# Patient Record
Sex: FEMALE | Race: WHITE | Employment: FULL TIME | URBAN - METROPOLITAN AREA
[De-identification: names, ages, dates, MRNs, and addresses within clinical notes are randomized per-mention and may not be internally consistent; named-entity substitution may affect disease eponyms.]

---

## 2021-09-14 ENCOUNTER — TRANSFERRED RECORDS (OUTPATIENT)
Dept: HEALTH INFORMATION MANAGEMENT | Facility: CLINIC | Age: 41
End: 2021-09-14

## 2021-09-15 ENCOUNTER — TRANSCRIBE ORDERS (OUTPATIENT)
Dept: OTHER | Age: 41
End: 2021-09-15

## 2021-09-15 DIAGNOSIS — C53.9 ADENOCARCINOMA OF CERVIX (H): Primary | ICD-10-CM

## 2021-09-20 ENCOUNTER — PRE VISIT (OUTPATIENT)
Dept: OTHER | Age: 41
End: 2021-09-20

## 2021-09-29 NOTE — TELEPHONE ENCOUNTER
Action 9.29.21 8:10 AM THONY   Action Taken Cone cervical biopsies x2 in maybe 2005, at [facility] in [place], [place]. [phone] in [place], [place].    Faxed request to [facility] for pathology reports [phone]. Faxed request for records to [facility] [phone].

## 2021-10-13 NOTE — PROGRESS NOTES
RECORDS STATUS - ALL OTHER DIAGNOSIS      RECORDS RECEIVED FROM: [facility]/ [facility]   DATE RECEIVED: 10/18/2021    NOTES STATUS DETAILS   OFFICE NOTE from referring provider Maury Lopez is referring to GYN ONC    OFFICE NOTE from medical oncologist Complete  [facility]   DISCHARGE SUMMARY from hospital N/A    DISCHARGE REPORT from the ER     OPERATIVE REPORT Benign Biopsies were done at [facility]  See Endocervix biopsy in CE 9/14/2021    Pap Test 9/14/2021    MEDICATION LIST N/A    CLINICAL TRIAL TREATMENTS TO DATE     LABS     PATHOLOGY REPORTS Benign biopsies were done at [facility]  9/14/2021   Case: [id number]                                   Endocervix, curettage-Wispy fragments of ectocervix with no dysplasia or malignancy.    1/15/2020   Endocervix, curettage-Benign squamous epithelium and rare endocervical glands, negative for dysplasia   ANYTHING RELATED TO DIAGNOSIS Complete Labs last updated on 9/14/2021    GENONOMIC TESTING     TYPE:     IMAGING (NEED IMAGES & REPORT)     CT SCANS     MRI     MAMMO     ULTRASOUND     PET       Action    Action Taken 10/13/2021 8:48AM JONA     I called pt Kanwal - her phone went to .     I called [facility] in [place], [place] Phone: [phone]- they don't have any imagine on the pt. We have all relevant records in EP[facility]

## 2021-10-18 ENCOUNTER — PRE VISIT (OUTPATIENT)
Dept: ONCOLOGY | Facility: CLINIC | Age: 41
End: 2021-10-18

## 2024-01-02 ENCOUNTER — LAB REQUISITION (OUTPATIENT)
Dept: LAB | Facility: CLINIC | Age: 44
End: 2024-01-02

## 2024-01-02 DIAGNOSIS — Z12.4 ENCOUNTER FOR SCREENING FOR MALIGNANT NEOPLASM OF CERVIX: ICD-10-CM

## 2024-01-02 PROCEDURE — 88305 TISSUE EXAM BY PATHOLOGIST: CPT | Performed by: PATHOLOGY

## 2024-01-04 LAB
PATH REPORT.COMMENTS IMP SPEC: NORMAL
PATH REPORT.COMMENTS IMP SPEC: NORMAL
PATH REPORT.FINAL DX SPEC: NORMAL
PATH REPORT.GROSS SPEC: NORMAL
PATH REPORT.MICROSCOPIC SPEC OTHER STN: NORMAL
PATH REPORT.RELEVANT HX SPEC: NORMAL
PHOTO IMAGE: NORMAL

## 2025-04-05 ENCOUNTER — TRANSFERRED RECORDS (OUTPATIENT)
Dept: HEALTH INFORMATION MANAGEMENT | Facility: CLINIC | Age: 45
End: 2025-04-05

## 2025-04-14 ENCOUNTER — LAB REQUISITION (OUTPATIENT)
Dept: LAB | Facility: CLINIC | Age: 45
End: 2025-04-14

## 2025-04-14 ENCOUNTER — TRANSFERRED RECORDS (OUTPATIENT)
Dept: HEALTH INFORMATION MANAGEMENT | Facility: CLINIC | Age: 45
End: 2025-04-14
Payer: COMMERCIAL

## 2025-04-14 ENCOUNTER — MEDICAL CORRESPONDENCE (OUTPATIENT)
Dept: HEALTH INFORMATION MANAGEMENT | Facility: CLINIC | Age: 45
End: 2025-04-14
Payer: COMMERCIAL

## 2025-04-14 ENCOUNTER — PATIENT OUTREACH (OUTPATIENT)
Dept: ONCOLOGY | Facility: CLINIC | Age: 45
End: 2025-04-14
Payer: COMMERCIAL

## 2025-04-14 ENCOUNTER — TRANSCRIBE ORDERS (OUTPATIENT)
Dept: OTHER | Age: 45
End: 2025-04-14

## 2025-04-14 DIAGNOSIS — C53.9 MALIGNANT NEOPLASM OF CERVIX, UNSPECIFIED SITE (H): Primary | ICD-10-CM

## 2025-04-14 DIAGNOSIS — Z13.220 ENCOUNTER FOR SCREENING FOR LIPOID DISORDERS: ICD-10-CM

## 2025-04-14 DIAGNOSIS — Z13.1 ENCOUNTER FOR SCREENING FOR DIABETES MELLITUS: ICD-10-CM

## 2025-04-14 DIAGNOSIS — R61 GENERALIZED HYPERHIDROSIS: ICD-10-CM

## 2025-04-14 DIAGNOSIS — Z13.6 ENCOUNTER FOR SCREENING FOR CARDIOVASCULAR DISORDERS: ICD-10-CM

## 2025-04-14 LAB
BASOPHILS # BLD AUTO: 0.1 10E3/UL (ref 0–0.2)
BASOPHILS NFR BLD AUTO: 1 %
CHOLEST SERPL-MCNC: 198 MG/DL
EOSINOPHIL # BLD AUTO: 0.2 10E3/UL (ref 0–0.7)
EOSINOPHIL NFR BLD AUTO: 3 %
ERYTHROCYTE [DISTWIDTH] IN BLOOD BY AUTOMATED COUNT: 13.2 % (ref 10–15)
EST. AVERAGE GLUCOSE BLD GHB EST-MCNC: 114 MG/DL
FASTING STATUS PATIENT QL REPORTED: NO
HBA1C MFR BLD: 5.6 %
HCT VFR BLD AUTO: 41.3 % (ref 35–47)
HDLC SERPL-MCNC: 68 MG/DL
HGB BLD-MCNC: 13.2 G/DL (ref 11.7–15.7)
IMM GRANULOCYTES # BLD: 0 10E3/UL
IMM GRANULOCYTES NFR BLD: 0 %
LDLC SERPL CALC-MCNC: 106 MG/DL
LYMPHOCYTES # BLD AUTO: 2.3 10E3/UL (ref 0.8–5.3)
LYMPHOCYTES NFR BLD AUTO: 30 %
MCH RBC QN AUTO: 28.6 PG (ref 26.5–33)
MCHC RBC AUTO-ENTMCNC: 32 G/DL (ref 31.5–36.5)
MCV RBC AUTO: 89 FL (ref 78–100)
MONOCYTES # BLD AUTO: 0.6 10E3/UL (ref 0–1.3)
MONOCYTES NFR BLD AUTO: 8 %
NEUTROPHILS # BLD AUTO: 4.5 10E3/UL (ref 1.6–8.3)
NEUTROPHILS NFR BLD AUTO: 58 %
NONHDLC SERPL-MCNC: 130 MG/DL
NRBC # BLD AUTO: 0 10E3/UL
NRBC BLD AUTO-RTO: 0 /100
PLATELET # BLD AUTO: 303 10E3/UL (ref 150–450)
RBC # BLD AUTO: 4.62 10E6/UL (ref 3.8–5.2)
TRIGL SERPL-MCNC: 121 MG/DL
WBC # BLD AUTO: 7.6 10E3/UL (ref 4–11)

## 2025-04-14 PROCEDURE — 82465 ASSAY BLD/SERUM CHOLESTEROL: CPT | Performed by: OBSTETRICS & GYNECOLOGY

## 2025-04-14 PROCEDURE — 83718 ASSAY OF LIPOPROTEIN: CPT | Performed by: OBSTETRICS & GYNECOLOGY

## 2025-04-14 PROCEDURE — 85048 AUTOMATED LEUKOCYTE COUNT: CPT | Performed by: OBSTETRICS & GYNECOLOGY

## 2025-04-14 PROCEDURE — 85004 AUTOMATED DIFF WBC COUNT: CPT | Performed by: OBSTETRICS & GYNECOLOGY

## 2025-04-14 PROCEDURE — 83036 HEMOGLOBIN GLYCOSYLATED A1C: CPT | Performed by: OBSTETRICS & GYNECOLOGY

## 2025-04-14 NOTE — PROGRESS NOTES
New Patient Hematology / Oncology Nurse Navigator Note     Referral Date: 4/14/25    Referring provider:       Referring Clinic/Organization: Other - [facility]     Referred to: GynOnc    Requested provider (if applicable): First available - did not specify     Evaluation for : Per notes, history of adenocarcinoma in situ (vs adenocarcinoma as listed on referral)       Clinical History (per Nurse review of records provided):    Note from OBGYN      -- BOOKMARKED  1/2/24 PAP/HPV/Path:  Final Diagnosis   A. Endocervix, curettage:  -No endocervical epithelium identified.  -Superficial strips of benign squamous epithelium.    -- BOOKMARKED      Clinical Assessment / Barriers to Care (Per Nurse):  Pt lives in [place]     Records Location: [facility] Everywhere   Faxed - Media tab/Scanned     Records Needed:   Need Op note/path reports from pt's cone procedures (2 per note from referring clinic) unsure when these were completed. Please check with referring clinic first to see if they have her [facility] records/path reports    I located note from [facility] in 2010 regarding h/o cone procedures so may be at [facility] prior to 2010?    Need Path showing Adenocarcinoma of cervix or Adenocarcinoma In-Situ       Additional testing needed prior to consult:   Pending review of complete records    Referral updates and Plan:   Referral received, chart reviewed by nursing, flagged for records intake as I am unable to location any path reports showing Adenocarcinoma of cervix or AIS. Will follow-up once complete records are obtained.     OUTGOING CALL TO PT:   Introduced my role as nurse navigator with [facility] and that we have recd the referral to GynOnc, but are still working to obtain complete records (want to confirm whether she has h/o Cervical cancer vs AIS) prior to consult with GynOnc.     Explained to pt that she will receive a call from our scheduling team once we have complete  records.     Provided my direct call back number if questions in the meantime.     Jacki Butts, BSN, RN, PHN, OCN  Hematology/Oncology Nurse Navigator  [facility]  [phone]

## 2025-04-15 ENCOUNTER — PRE VISIT (OUTPATIENT)
Dept: ONCOLOGY | Facility: CLINIC | Age: 45
End: 2025-04-15
Payer: COMMERCIAL

## 2025-04-15 NOTE — TELEPHONE ENCOUNTER
RECORDS STATUS - ALL OTHER DIAGNOSIS      RECORDS RECEIVED FROM:    DIAGNOSIS: Malignant neoplasm of cervix, unspecified site (H) [C53.9]   NOTES STATUS DETAILS   OFFICE NOTE from referring provider Ext: [facility] 04/14/25: Dr. Emelyn Lopez   OFFICE NOTE from medical oncologist     OFFICE NOTE from other specialist     DISCHARGE SUMMARY from hospital     DISCHARGE REPORT from the ER     OPERATIVE REPORT Req 4/15 [facility] BX (2002?)    NM:  02/05/10: Attempted hysterosalpingogram   MEDICATION LIST [facility]    LABS     PATHOLOGY REPORTS  04/02/18:[id number]   ANYTHING RELATED TO DIAGNOSIS     PATHOLOGY FEDEX TRACKING   Tracking #:   GENONOMIC TESTING     TYPE:     IMAGING (NEED IMAGES & REPORT)     CT SCANS     MRI     XRAYS     ULTRASOUND     PET     IMAGE DISC FEDEX TRACKING   Tracking #:

## 2025-05-08 ENCOUNTER — PATIENT OUTREACH (OUTPATIENT)
Dept: ONCOLOGY | Facility: CLINIC | Age: 45
End: 2025-05-08

## 2025-05-08 ENCOUNTER — ONCOLOGY VISIT (OUTPATIENT)
Dept: ONCOLOGY | Facility: CLINIC | Age: 45
End: 2025-05-08
Attending: OBSTETRICS & GYNECOLOGY
Payer: COMMERCIAL

## 2025-05-08 VITALS
SYSTOLIC BLOOD PRESSURE: 128 MMHG | OXYGEN SATURATION: 100 % | HEART RATE: 68 BPM | BODY MASS INDEX: 25.49 KG/M2 | WEIGHT: 162.4 LBS | DIASTOLIC BLOOD PRESSURE: 85 MMHG | HEIGHT: 67 IN

## 2025-05-08 DIAGNOSIS — D06.9 ADENOCARCINOMA IN SITU (AIS) OF UTERINE CERVIX: Primary | ICD-10-CM

## 2025-05-08 PROCEDURE — 99214 OFFICE O/P EST MOD 30 MIN: CPT | Performed by: OBSTETRICS & GYNECOLOGY

## 2025-05-08 ASSESSMENT — PAIN SCALES - GENERAL: PAINLEVEL_OUTOF10: NO PAIN (0)

## 2025-05-08 NOTE — LETTER
2025      Kanwal Mcfadden  [address] [facility] [address]      Dear Colleague,    Thank you for referring your patient, Kanwal Mcfadden, to the [facility]. Please see a copy of my visit note below.                            Consult Notes on Referred Patient      Dr. Emelyn Lopez MD  [address] [place] [address]       RE: Kanwal Mcfadden  : 1980  NATHAN: 2025    Dear Dr. Emelyn Lopez:    I had the pleasure of seeing your patient Kanwal Mcfadden here at the Gynecologic Cancer Clinic at the [facility] on 2025.  As you know she is a very pleasant 44 year old woman with a remote diagnosis of ACIS/CIN3.  Given these findings she was subsequently sent to the Gynecologic Cancer Clinic for new patient consultation.     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, margins neg; no invasive disease   repeat cone - negative     PAp/ECC: negative   Pap/ECC negative   PAp/ECC neg   Pap/HPV negative    PAP/HPV negative - exams complicated by scarring of the cervix    Review of Systems:    Systemic           no weight changes; no fever; no chills; no night sweats; no appetite changes  Skin           no rashes, or lesions  Eye           no irritation; no changes in vision  Renato-Laryngeal           no dysphagia; no hoarseness   Pulmonary    no cough; no shortness of breath  Cardiovascular    no chest pain; no palpitations  Gastrointestinal    no diarrhea; no constipation; no abdominal pain; no changes in bowel  habits; no blood in stool  Genitourinary   no urinary frequency; no urinary urgency; no dysuria; no pain; no abnormal vaginal discharge; no abnormal vaginal bleeding  Breast   no breast discharge; no breast changes; no breast pain  Musculoskeletal    no myalgias; no arthralgias; no back pain  Psychiatric           no depressed mood; no  "anxiety    Hematologic           no tender lymph nodes; no noticeable swellings or lumps   Endocrine    no hot flashes; no heat/cold intolerance         Neurological   no tremor; no numbness and tingling; no headaches; no difficulty  sleeping      Past Medical History:    Past Medical History:   Diagnosis Date     NO ACTIVE PROBLEMS          Past Surgical History:    Past Surgical History:   Procedure Laterality Date     cone biopsies       x2     GYN SURGERY                 Health Maintenance:  Health Maintenance Due   Topic Date Due     ADVANCE CARE PLANNING  Never done     HEPATITIS B IMMUNIZATION (1 of 3 - + 3-dose series) Never done     COVID-19 Vaccine ( season) 2024     PHQ-2 (once per calendar year)  Never done       Last Pap Smear: See above    Last Mammogram:  BIRADS1    Last Colonoscopy: Yes (hemorrhoids (no polyps) per report      Current Medications:     currently has no medications in their medication list.       Allergies:     Patient has no known allergies.        Social History:     Social History     Tobacco Use     Smoking status: Never     Smokeless tobacco: Never   Substance Use Topics     Alcohol use: Yes     Comment: rare     History   Drug Use No       Family History:     Family History   Problem Relation Age of Onset     Asthma No family hx of      C.A.D. No family hx of      Diabetes No family hx of      Hypertension No family hx of      Cerebrovascular Disease No family hx of      Cancer No family hx of      Arthritis No family hx of      Cardiovascular No family hx of      Blood Disease No family hx of      Breast Cancer No family hx of      Colon Cancer No family hx of      Ovarian Cancer No family hx of      Uterine Cancer No family hx of          Physical Exam:     PS 0  VS: /85 (BP Location: Right arm)   Pulse 68   Ht 1.702 m (5' 7\")   Wt 73.7 kg (162 lb 6.4 oz)   LMP 2025   SpO2 100%   BMI 25.44 kg/m       General Appearance: healthy " and alert, no distress     HEENT:  no thyromegaly, no palpable nodules or masses        Cardiovascular: regular rate and rhythm, no gallops, rubs or murmurs     Respiratory: lungs clear, no rales, rhonchi or wheezes, normal diaphragmatic excursion    Musculoskeletal: extremities non tender and without edema    Skin: no lesions or rashes     Neurological: normal gait, no gross defects     Psychiatric: appropriate mood and affect                               Hematological: normal cervical, supraclavicular and inguinal lymph nodes     Gastrointestinal:       abdomen soft, non-tender, non-distended, no organomegaly or masses    Genitourinary: External genitalia and urethral meatus appears normal.  Vagina is smooth without nodularity or masses.  Cervix appears scarred and posteriorly displaced - it is difficult to see the os, but there is no jack cancer and there is good tissue mobility  Bimanual exam reveal no masses but probable fibroid, no other nodularity or fullness.  Recto-vaginal exam deferred      Assessment:    Kanwal Mcfadden is a 44 year old woman with a diagnosis of ACIS and squamous dysplasia with prolonged negative follow-up, presenting to discuss hysterectomy .     A total of 60 minutes was spent with the patient, 40 minutes of which were spent in counseling the patient and/or treatment planning.      Plan:     1.)   ACIS:WE have discussed that the SOC in this exact setting is unclear because of the prolonged negative follow-up, but that generally hysterectomy after completion of childbearing is recommended owing to the difficulty in excluding ACIS or progression (her initial biopsy showed multifocal disease.)  Given the difficulty with sampling, hysterectomy is reasonable.  WE have discussed ovarian preservation, which I recommended and she has accepted.  She would like to defer surgery until later in the summer, which is reasonable, but she understands she will have to return for pre-operative  evaluation.     2.) Genetic risk factors were assessed and the patient does not meet the qualifications for a referral.      3.) Labs and/or tests ordered include:  none.     4.) Health maintenance issues addressed today include none.    5.) Pre-op teaching was completed today.  Risks of surgery were discussed to include: bleeding, transfusion, infection, unintentional injury to surrounding organs/structures.      Thank you for allowing us to participate in the care of your patient.         Sincerely,    Flavio Almeida MD        Patient Care Team:  Flavio Almeida MD as MD (Gynecologic Oncology)  Emelyn Franco MD as MD (OB/Gyn)  EMELYN FRANCO        Again, thank you for allowing me to participate in the care of your patient.        Sincerely,        Flavio Almeida MD    Electronically signed

## 2025-05-08 NOTE — NURSING NOTE
"Oncology Rooming Note    May 8, 2025 9:49 AM   Kanwal Mcfadden is a 44 year old female who presents for:    Chief Complaint   Patient presents with    Oncology Clinic Visit     Initial Vitals: /85 (BP Location: Right arm)   Pulse 68   Ht 1.702 m (5' 7\")   Wt 73.7 kg (162 lb 6.4 oz)   LMP 04/24/2025   SpO2 100%   BMI 25.44 kg/m   Estimated body mass index is 25.44 kg/m  as calculated from the following:    Height as of this encounter: 1.702 m (5' 7\").    Weight as of this encounter: 73.7 kg (162 lb 6.4 oz). Body surface area is 1.87 meters squared.  No Pain (0) Comment: Data Unavailable   Patient's last menstrual period was 04/24/2025.  Allergies reviewed: Yes  Medications reviewed: Yes    Medications: Medication refills not needed today.  Pharmacy name entered into [facility]: [id number] [place] [facility] [address] [place] [address]    Frailty Screening:   Is the patient here for a new oncology consult visit in cancer care? 1. Yes. Over the past month, have you experienced difficulty or required a caregiver to assist with:   1. Balance, walking or general mobility (including any falls)? NO  2. Completion of self-care tasks such as bathing, dressing, toileting, grooming/hygiene?  NO  3. Concentration or memory that affects your daily life?  NO     PHQ9:  Did this patient require a PHQ9?: No      Clinical concerns: Patient will discuss concerns with provider.       Maldonado Amaya MA            "

## 2025-05-08 NOTE — PROGRESS NOTES
[facility]: Cancer Care Initial Note                                    Situation:                                                      RN met with patient in clinic this morning, following her consult appointment with Dr. Almeida.  Reviewed Dr. Almeida's recommendation for surgery.  Provided surgery folder and handouts, as well as a map of [facility], and contact information for our clinic.      Assessment:                                                      Patient states she would like to take some time to think about when it will work best for her to schedule the surgery.   Currently, she is leaning toward late July - but states she will call us when she makes a decision.      Plan:                                                       Instructed patient to call us when she is ready to schedule her surgery.  Patient was also instructed that she will need to schedule a pre-op appointment with Dr. Almeida within 3-4 weeks prior to surgery, for consenting and education.      Patient verbalized understanding, and stated agreement with plan.      Henry Che, RN, BSN, OCN  RN Care Coordinator - Oncology  [facility]

## 2025-05-08 NOTE — PATIENT INSTRUCTIONS
Please contact us when you are ready to schedule surgery.    Follow up with Dr. Almeida again, around 3-4 weeks before surgery.    Please call us with any questions or concerns following your appointment with us today. Thank you for choosing [facility].    [facility] - Contact Information    Clinic Hours: Monday - Friday, 8:00 AM to 4:30 PM    Appointment Scheduling [phone] (option #4)   Surgery Scheduling [phone]   Triage Nurses (for symptom/side effect concerns, or urgent needs) [phone]   After-Hours Advice Line [phone] or [phone]   Dr. Almeida's Nurse, Henry [phone]   Fax Number [phone]

## 2025-05-08 NOTE — PROGRESS NOTES
Consult Notes on Referred Patient      Dr. Emelyn Lopez MD  [address] [place] [address]       RE: Kanwal Mcfadden  : 1980  NATHAN: 2025    Dear Dr. Emelyn Lopez:    I had the pleasure of seeing your patient Kanwal Mcfadden here at the Gynecologic Cancer Clinic at the [facility] on 2025.  As you know she is a very pleasant 44 year old woman with a remote diagnosis of ACIS/CIN3.  Given these findings she was subsequently sent to the Gynecologic Cancer Clinic for new patient consultation.     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, margins neg; no invasive disease   repeat cone - negative     PAp/ECC: negative   Pap/ECC negative   PAp/ECC neg   Pap/HPV negative    PAP/HPV negative - exams complicated by scarring of the cervix    Review of Systems:    Systemic           no weight changes; no fever; no chills; no night sweats; no appetite changes  Skin           no rashes, or lesions  Eye           no irritation; no changes in vision  Renato-Laryngeal           no dysphagia; no hoarseness   Pulmonary    no cough; no shortness of breath  Cardiovascular    no chest pain; no palpitations  Gastrointestinal    no diarrhea; no constipation; no abdominal pain; no changes in bowel  habits; no blood in stool  Genitourinary   no urinary frequency; no urinary urgency; no dysuria; no pain; no abnormal vaginal discharge; no abnormal vaginal bleeding  Breast   no breast discharge; no breast changes; no breast pain  Musculoskeletal    no myalgias; no arthralgias; no back pain  Psychiatric           no depressed mood; no anxiety    Hematologic           no tender lymph nodes; no noticeable swellings or lumps   Endocrine    no hot flashes; no heat/cold intolerance         Neurological   no tremor; no numbness and tingling; no headaches; no difficulty  sleeping      Past  "Medical History:    Past Medical History:   Diagnosis Date    NO ACTIVE PROBLEMS          Past Surgical History:    Past Surgical History:   Procedure Laterality Date    cone biopsies       x2    GYN SURGERY                 Health Maintenance:  Health Maintenance Due   Topic Date Due    ADVANCE CARE PLANNING  Never done    HEPATITIS B IMMUNIZATION (1 of 3 - 19+ 3-dose series) Never done    COVID-19 Vaccine ( season) 2024    PHQ-2 (once per calendar year)  Never done       Last Pap Smear: See above    Last Mammogram:  BIRADS1    Last Colonoscopy: Yes (hemorrhoids (no polyps) per report      Current Medications:     currently has no medications in their medication list.       Allergies:     Patient has no known allergies.        Social History:     Social History     Tobacco Use    Smoking status: Never    Smokeless tobacco: Never   Substance Use Topics    Alcohol use: Yes     Comment: rare     History   Drug Use No       Family History:     Family History   Problem Relation Age of Onset    Asthma No family hx of     C.A.D. No family hx of     Diabetes No family hx of     Hypertension No family hx of     Cerebrovascular Disease No family hx of     Cancer No family hx of     Arthritis No family hx of     Cardiovascular No family hx of     Blood Disease No family hx of     Breast Cancer No family hx of     Colon Cancer No family hx of     Ovarian Cancer No family hx of     Uterine Cancer No family hx of          Physical Exam:     PS 0  VS: /85 (BP Location: Right arm)   Pulse 68   Ht 1.702 m (5' 7\")   Wt 73.7 kg (162 lb 6.4 oz)   LMP 2025   SpO2 100%   BMI 25.44 kg/m       General Appearance: healthy and alert, no distress     HEENT:  no thyromegaly, no palpable nodules or masses        Cardiovascular: regular rate and rhythm, no gallops, rubs or murmurs     Respiratory: lungs clear, no rales, rhonchi or wheezes, normal diaphragmatic " excursion    Musculoskeletal: extremities non tender and without edema    Skin: no lesions or rashes     Neurological: normal gait, no gross defects     Psychiatric: appropriate mood and affect                               Hematological: normal cervical, supraclavicular and inguinal lymph nodes     Gastrointestinal:       abdomen soft, non-tender, non-distended, no organomegaly or masses    Genitourinary: External genitalia and urethral meatus appears normal.  Vagina is smooth without nodularity or masses.  Cervix appears scarred and posteriorly displaced - it is difficult to see the os, but there is no jack cancer and there is good tissue mobility  Bimanual exam reveal no masses but probable fibroid, no other nodularity or fullness.  Recto-vaginal exam deferred      Assessment:    Kanwal Mcfadden is a 44 year old woman with a diagnosis of ACIS and squamous dysplasia with prolonged negative follow-up, presenting to discuss hysterectomy .     A total of 60 minutes was spent with the patient, 40 minutes of which were spent in counseling the patient and/or treatment planning.      Plan:     1.)   ACIS:WE have discussed that the SOC in this exact setting is unclear because of the prolonged negative follow-up, but that generally hysterectomy after completion of childbearing is recommended owing to the difficulty in excluding ACIS or progression (her initial biopsy showed multifocal disease.)  Given the difficulty with sampling, hysterectomy is reasonable.  WE have discussed ovarian preservation, which I recommended and she has accepted.  She would like to defer surgery until later in the summer, which is reasonable, but she understands she will have to return for pre-operative evaluation.     2.) Genetic risk factors were assessed and the patient does not meet the qualifications for a referral.      3.) Labs and/or tests ordered include:  none.     4.) Health maintenance issues addressed today include  none.    5.) Pre-op teaching was completed today.  Risks of surgery were discussed to include: bleeding, transfusion, infection, unintentional injury to surrounding organs/structures.      Thank you for allowing us to participate in the care of your patient.         Sincerely,    Flavio Almeida MD      CC  Patient Care Team:  Flavio Almeida MD as MD (Gynecologic Oncology)  Emelyn Franco MD as MD (OB/Gyn)  EMELYN FRANCO

## 2025-06-05 DIAGNOSIS — D06.9 ADENOCARCINOMA IN SITU (AIS) OF UTERINE CERVIX: Primary | ICD-10-CM

## 2025-06-05 RX ORDER — CEFAZOLIN SODIUM 2 G/50ML
2 SOLUTION INTRAVENOUS
OUTPATIENT
Start: 2025-06-05

## 2025-06-05 RX ORDER — METRONIDAZOLE 500 MG/100ML
500 INJECTION, SOLUTION INTRAVENOUS
OUTPATIENT
Start: 2025-06-05

## 2025-06-05 RX ORDER — HEPARIN SODIUM 5000 [USP'U]/.5ML
5000 INJECTION, SOLUTION INTRAVENOUS; SUBCUTANEOUS
OUTPATIENT
Start: 2025-06-05

## 2025-06-05 RX ORDER — PHENAZOPYRIDINE HYDROCHLORIDE 200 MG/1
200 TABLET, FILM COATED ORAL ONCE
OUTPATIENT
Start: 2025-06-05 | End: 2025-06-05

## 2025-06-05 RX ORDER — CEFAZOLIN SODIUM 2 G/50ML
2 SOLUTION INTRAVENOUS SEE ADMIN INSTRUCTIONS
OUTPATIENT
Start: 2025-06-05

## 2025-07-24 ENCOUNTER — PATIENT OUTREACH (OUTPATIENT)
Dept: ONCOLOGY | Facility: CLINIC | Age: 45
End: 2025-07-24
Payer: COMMERCIAL

## 2025-07-24 ENCOUNTER — ONCOLOGY VISIT (OUTPATIENT)
Dept: ONCOLOGY | Facility: CLINIC | Age: 45
End: 2025-07-24
Attending: OBSTETRICS & GYNECOLOGY
Payer: COMMERCIAL

## 2025-07-24 VITALS
DIASTOLIC BLOOD PRESSURE: 81 MMHG | SYSTOLIC BLOOD PRESSURE: 120 MMHG | HEART RATE: 72 BPM | RESPIRATION RATE: 16 BRPM | OXYGEN SATURATION: 100 % | WEIGHT: 159.6 LBS | BODY MASS INDEX: 25.05 KG/M2 | HEIGHT: 67 IN

## 2025-07-24 DIAGNOSIS — D06.9 ADENOCARCINOMA IN SITU (AIS) OF UTERINE CERVIX: Primary | ICD-10-CM

## 2025-07-24 PROCEDURE — 99213 OFFICE O/P EST LOW 20 MIN: CPT | Performed by: OBSTETRICS & GYNECOLOGY

## 2025-07-24 ASSESSMENT — PAIN SCALES - GENERAL: PAINLEVEL_OUTOF10: NO PAIN (0)

## 2025-07-24 NOTE — PROGRESS NOTES
"[facility]: Cancer Care Follow-Up Note                                    Situation:                                                      RN met with patient in clinic this afternoon, following her office visit with Dr. Almeida.  Reviewed Dr. Almeida's recommendation for surgery.  Provided surgery folder with handouts, education, contact information for our clinic, and a bottle of Hibiclens soap.    Patient signed e-consents for surgery and possible blood transfusion while in clinic today.  Patient also signed the Hysterectomy Acknowledgement Statement form, which was faxed to [facility] this afternoon (and also sent for scanning).    Assessment:                                                      Pre/Post Procedure:   Surgery/Procedure plan reviewed with patient  Planned Surgery or Procedure: Laparoscopy, removal of uterus, cervix, fallopian tubes, possible cystoscopy.  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  Preoperative Surgical Consult Date: 07/24/25  Pre-Op Physical to be completed by (e.g.: PCP, Pre-Assessment Center, etc.):: Surgeon  Post-op Appointment Date: 08/28/25     Education  Person Taught: patient  Learning Readiness and Ability: no barriers identified  Pre-op Care Instructions: proper use of medications, when to take, and when to hold, when to call provider  Pre-op Infection Prevention Reviewed: bathing care after procedure, pre-op CHG bathing instructions  Pre-op Planning Reviewed:  arrangements, if indicated, how to get to procedure location, post-op support plan (\"who will help care for you after your surgery/procedure?\")  Pre-op Education/Instructions provided: how to prepare for surgery, what to expect on surgery day, surgery location specifics (map, parking, phone number), showering before surgery, eating before and after surgery  Post-op Care Instructions: when to call provider, home " care/follow-up care, pain management, diet, bowel management, blood clot prevention, sexual activity restriction, nausea management, physical activity restriction, incision care/wound management  Education Outcome Evaluation: acceptance expressed    Pre-op Checklist Reviewed  Labs: n/a (Labs to be drawn DOS)  Chest X-Ray: n/a  EKG: n/a (EKG not needed, per Dr. Almeida.)  Anticoagulation plan: n/a  Bowel Prep: n/a     Plan:                                                       Patient will plan to proceed with surgery as currently scheduled.  Confirmed she is aware of her post-op appointment with Dr. Almeida.    Encouraged patient to reach out with any questions or concerns following today's visit.      Henry Che, RN, BSN, OCN  RN Care Coordinator - Oncology  [facility]

## 2025-07-24 NOTE — LETTER
2025      Kanwal Mcfadden  [address] [address]      Dear Colleague,    Thank you for referring your patient, Kanwal Mcfadden, to the [facility]. Please see a copy of my visit note below.                            Consult Notes on Referred Patient      Dr. Emelyn Lopez MD  [address] [place] [address]       RE: Kanwal Mcfadden  : 1980  NATHAN: 2025     Dear Dr. Emelyn Lopez:    I had the pleasure of seeing your patient Kanwal Mcfadden here at the Gynecologic Cancer Clinic at the [facility].  As you know she is a very pleasant 45 year old woman with a remote diagnosis of ACIS/CIN3.  Given these findings she was subsequently sent to the Gynecologic Cancer Clinic for new patient consultation.     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, margins neg; no invasive disease   repeat cone - negative     PAp/ECC: negative   Pap/ECC negative   PAp/ECC neg   Pap/HPV negative    PAP/HPV negative - exams complicated by scarring of the cervix        Review of Systems:    Systemic           no weight changes; no fever; no chills; no night sweats; no appetite changes  Skin           no rashes, or lesions  Eye           no irritation; no changes in vision  Renato-Laryngeal           no dysphagia; no hoarseness   Pulmonary    no cough; no shortness of breath  Cardiovascular    no chest pain; no palpitations  Gastrointestinal    no diarrhea; no constipation; no abdominal pain; no changes in bowel  habits; no blood in stool  Genitourinary   no urinary frequency; no urinary urgency; no dysuria; no pain; no abnormal vaginal discharge; no abnormal vaginal bleeding  Breast   no breast discharge; no breast changes; no breast pain  Musculoskeletal    no myalgias; no arthralgias; no back pain  Psychiatric           no depressed mood; no anxiety   "  Hematologic           no tender lymph nodes; no noticeable swellings or lumps   Endocrine    no hot flashes; no heat/cold intolerance         Neurological   no tremor; no numbness and tingling; no headaches; no difficulty  sleeping      Past Medical History:    Past Medical History:   Diagnosis Date     NO ACTIVE PROBLEMS          Past Surgical History:    Past Surgical History:   Procedure Laterality Date     cone biopsies       x2     GYN SURGERY                 Health Maintenance:  Health Maintenance Due   Topic Date Due     ADVANCE CARE PLANNING  Never done     HEPATITIS B VACCINE (1 of 3 - 19+ 3-dose series) Never done     COVID-19 VACCINE (3 - 2024-25 season) 2024       Last Pap Smear: See above    Last Mammogram:  BIRADS1    Last Colonoscopy: Yes (hemorrhoids (no polyps) per report      Current Medications:     currently has no medications in their medication list.       Allergies:     Patient has no known allergies.        Social History:     Social History     Tobacco Use     Smoking status: Never     Smokeless tobacco: Never   Substance Use Topics     Alcohol use: Yes     Comment: rare (0-2/ week)     History   Drug Use No       Family History:     Family History   Problem Relation Age of Onset     Asthma No family hx of      C.A.D. No family hx of      Diabetes No family hx of      Hypertension No family hx of      Cerebrovascular Disease No family hx of      Cancer No family hx of      Arthritis No family hx of      Cardiovascular No family hx of      Blood Disease No family hx of      Breast Cancer No family hx of      Colon Cancer No family hx of      Ovarian Cancer No family hx of      Uterine Cancer No family hx of          Physical Exam:     PS 0  VS: /81   Pulse 72   Resp 16   Ht 1.702 m (5' 7\")   Wt 72.4 kg (159 lb 9.6 oz)   SpO2 100%   BMI 25.00 kg/m       General Appearance: healthy and alert, no distress     HEENT:  no thyromegaly, no palpable nodules or masses "        Cardiovascular: regular rate and rhythm, no gallops, rubs or murmurs     Respiratory: lungs clear, no rales, rhonchi or wheezes, normal diaphragmatic excursion    Musculoskeletal: extremities non tender and without edema    Skin: no lesions or rashes     Neurological: normal gait, no gross defects     Psychiatric: appropriate mood and affect                               Hematological: normal cervical, supraclavicular and inguinal lymph nodes     Gastrointestinal:       abdomen soft, non-tender, non-distended, no organomegaly or masses    Genitourinary: External genitalia and urethral meatus appears normal.  Vagina is smooth without nodularity or masses.  Cervix appears scarred and posteriorly displaced - it is difficult to see the os, but there is no jack cancer and there is good tissue mobility  Bimanual exam reveal no masses but probable fibroid, no other nodularity or fullness.  Recto-vaginal exam deferred      Assessment:    Kanwal Mcfadden is a woman with a diagnosis of ACIS and squamous dysplasia with prolonged negative follow-up, presenting to discuss hysterectomy .     A total of 30 minutes was spent with the patient, 20 minutes of which were spent in counseling the patient and/or treatment planning.      Plan:     1.)   ACIS:WE have discussed that the SOC in this exact setting is unclear because of the prolonged negative follow-up, but that generally hysterectomy after completion of childbearing is recommended owing to the difficulty in excluding ACIS or progression (her initial biopsy showed multifocal disease.)  Given the difficulty with sampling, hysterectomy is reasonable.  WE have discussed ovarian preservation, which I recommended and she has accepted.  She previously deferred surgery,m but now feels as though she is ready.  She understands that there may or may not be residual disease, but accepts this.     2.) Genetic risk factors were assessed and the patient does not meet the  qualifications for a referral.      3.) Labs and/or tests ordered include:  pre-op labs     4.) Health maintenance issues addressed today include none.    5.) Pre-op teaching was completed today.  Risks of surgery were discussed to include: bleeding, transfusion, infection, unintentional injury to surrounding organs/structures.      Thank you for allowing us to participate in the care of your patient.         Sincerely,    Flavio Almeida MD        Patient Care Team:  Elena Thomas MD as PCP - General (Orthopaedic Surgery)  Flavio Almeida MD as MD (Gynecologic Oncology)  Emelyn Franco MD as MD (OB/Gyn)  Flavio Almeida MD as Assigned Cancer Care Provider  EMELYN FRANCO        Again, thank you for allowing me to participate in the care of your patient.        Sincerely,        Flavio Almeida MD    Electronically signed

## 2025-07-24 NOTE — NURSING NOTE
"Oncology Rooming Note    July 24, 2025 1:24 PM   Kanwal Mcfadden is a 45 year old female who presents for:    Chief Complaint   Patient presents with    Oncology Clinic Visit     Pre Op  DOS 8/6      Initial Vitals: /81   Pulse 72   Resp 16   Ht 1.702 m (5' 7\")   Wt 72.4 kg (159 lb 9.6 oz)   SpO2 100%   BMI 25.00 kg/m   Estimated body mass index is 25 kg/m  as calculated from the following:    Height as of this encounter: 1.702 m (5' 7\").    Weight as of this encounter: 72.4 kg (159 lb 9.6 oz). Body surface area is 1.85 meters squared.  No Pain (0) Comment: Data Unavailable   No LMP recorded.  Allergies reviewed: Yes  Medications reviewed: Yes    Medications: Medication refills not needed today.  Pharmacy name entered into [employer] [facility] [employer]: [id number] [place] [facility] [address]    PHQ9:  Did this patient require a PHQ9?: No      Clinical concerns: None Noted       Ashley Mcknight MA            "

## 2025-07-24 NOTE — PROGRESS NOTES
Consult Notes on Referred Patient      Dr. Emelyn Lopez MD  [address] [place] [address]       RE: Kanwal Mcfadden  : 1980  NATHAN: 2025     Dear Dr. Emelyn Lopez:    I had the pleasure of seeing your patient Kanwal Mcfadden here at the Gynecologic Cancer Clinic at the [facility].  As you know she is a very pleasant 45 year old woman with a remote diagnosis of ACIS/CIN3.  Given these findings she was subsequently sent to the Gynecologic Cancer Clinic for new patient consultation.     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, margins neg; no invasive disease   repeat cone - negative     PAp/ECC: negative   Pap/ECC negative   PAp/ECC neg   Pap/HPV negative    PAP/HPV negative - exams complicated by scarring of the cervix        Review of Systems:    Systemic           no weight changes; no fever; no chills; no night sweats; no appetite changes  Skin           no rashes, or lesions  Eye           no irritation; no changes in vision  Renato-Laryngeal           no dysphagia; no hoarseness   Pulmonary    no cough; no shortness of breath  Cardiovascular    no chest pain; no palpitations  Gastrointestinal    no diarrhea; no constipation; no abdominal pain; no changes in bowel  habits; no blood in stool  Genitourinary   no urinary frequency; no urinary urgency; no dysuria; no pain; no abnormal vaginal discharge; no abnormal vaginal bleeding  Breast   no breast discharge; no breast changes; no breast pain  Musculoskeletal    no myalgias; no arthralgias; no back pain  Psychiatric           no depressed mood; no anxiety    Hematologic           no tender lymph nodes; no noticeable swellings or lumps   Endocrine    no hot flashes; no heat/cold intolerance         Neurological   no tremor; no numbness and tingling; no headaches; no difficulty  sleeping      Past Medical  "History:    Past Medical History:   Diagnosis Date    NO ACTIVE PROBLEMS          Past Surgical History:    Past Surgical History:   Procedure Laterality Date    cone biopsies       x2    GYN SURGERY                 Health Maintenance:  Health Maintenance Due   Topic Date Due    ADVANCE CARE PLANNING  Never done    HEPATITIS B VACCINE (1 of 3 - 19+ 3-dose series) Never done    COVID-19 VACCINE (3 - 2024- season) 2024       Last Pap Smear: See above    Last Mammogram:  BIRADS1    Last Colonoscopy: Yes (hemorrhoids (no polyps) per report      Current Medications:     currently has no medications in their medication list.       Allergies:     Patient has no known allergies.        Social History:     Social History     Tobacco Use    Smoking status: Never    Smokeless tobacco: Never   Substance Use Topics    Alcohol use: Yes     Comment: rare (0-2/ week)     History   Drug Use No       Family History:     Family History   Problem Relation Age of Onset    Asthma No family hx of     C.A.D. No family hx of     Diabetes No family hx of     Hypertension No family hx of     Cerebrovascular Disease No family hx of     Cancer No family hx of     Arthritis No family hx of     Cardiovascular No family hx of     Blood Disease No family hx of     Breast Cancer No family hx of     Colon Cancer No family hx of     Ovarian Cancer No family hx of     Uterine Cancer No family hx of          Physical Exam:     PS 0  VS: /81   Pulse 72   Resp 16   Ht 1.702 m (5' 7\")   Wt 72.4 kg (159 lb 9.6 oz)   SpO2 100%   BMI 25.00 kg/m       General Appearance: healthy and alert, no distress     HEENT:  no thyromegaly, no palpable nodules or masses        Cardiovascular: regular rate and rhythm, no gallops, rubs or murmurs     Respiratory: lungs clear, no rales, rhonchi or wheezes, normal diaphragmatic excursion    Musculoskeletal: extremities non tender and without edema    Skin: no lesions or rashes "     Neurological: normal gait, no gross defects     Psychiatric: appropriate mood and affect                               Hematological: normal cervical, supraclavicular and inguinal lymph nodes     Gastrointestinal:       abdomen soft, non-tender, non-distended, no organomegaly or masses    Genitourinary: External genitalia and urethral meatus appears normal.  Vagina is smooth without nodularity or masses.  Cervix appears scarred and posteriorly displaced - it is difficult to see the os, but there is no jack cancer and there is good tissue mobility  Bimanual exam reveal no masses but probable fibroid, no other nodularity or fullness.  Recto-vaginal exam deferred      Assessment:    Kanwal Mcfadden is a woman with a diagnosis of ACIS and squamous dysplasia with prolonged negative follow-up, presenting to discuss hysterectomy .     A total of 30 minutes was spent with the patient, 20 minutes of which were spent in counseling the patient and/or treatment planning.      Plan:     1.)   ACIS:WE have discussed that the SOC in this exact setting is unclear because of the prolonged negative follow-up, but that generally hysterectomy after completion of childbearing is recommended owing to the difficulty in excluding ACIS or progression (her initial biopsy showed multifocal disease.)  Given the difficulty with sampling, hysterectomy is reasonable.  WE have discussed ovarian preservation, which I recommended and she has accepted.  She previously deferred surgery,m but now feels as though she is ready.  She understands that there may or may not be residual disease, but accepts this.     2.) Genetic risk factors were assessed and the patient does not meet the qualifications for a referral.      3.) Labs and/or tests ordered include:  pre-op labs     4.) Health maintenance issues addressed today include none.    5.) Pre-op teaching was completed today.  Risks of surgery were discussed to include: bleeding, transfusion,  infection, unintentional injury to surrounding organs/structures.      Thank you for allowing us to participate in the care of your patient.         Sincerely,    Flavio Almeida MD      CC  Patient Care Team:  Elena Thomas MD as PCP - General (Orthopaedic Surgery)  Flavio Almeida MD as MD (Gynecologic Oncology)  Emelyn Franco MD as MD (OB/Gyn)  Flavio Almeida MD as Assigned Cancer Care Provider  EMELYN FRANCO

## 2025-07-29 ENCOUNTER — TRANSFERRED RECORDS (OUTPATIENT)
Dept: HEALTH INFORMATION MANAGEMENT | Facility: CLINIC | Age: 45
End: 2025-07-29
Payer: COMMERCIAL

## 2025-08-05 ENCOUNTER — ANESTHESIA EVENT (OUTPATIENT)
Dept: SURGERY | Facility: CLINIC | Age: 45
End: 2025-08-05
Payer: COMMERCIAL

## 2025-08-06 ENCOUNTER — HOSPITAL ENCOUNTER (OUTPATIENT)
Facility: CLINIC | Age: 45
Discharge: HOME | End: 2025-08-06
Attending: OBSTETRICS & GYNECOLOGY | Admitting: OBSTETRICS & GYNECOLOGY
Payer: COMMERCIAL

## 2025-08-06 ENCOUNTER — ANESTHESIA (OUTPATIENT)
Dept: SURGERY | Facility: CLINIC | Age: 45
End: 2025-08-06
Payer: COMMERCIAL

## 2025-08-06 VITALS
WEIGHT: 159.61 LBS | TEMPERATURE: 98.4 F | OXYGEN SATURATION: 100 % | RESPIRATION RATE: 16 BRPM | DIASTOLIC BLOOD PRESSURE: 75 MMHG | BODY MASS INDEX: 25.05 KG/M2 | HEART RATE: 72 BPM | SYSTOLIC BLOOD PRESSURE: 120 MMHG | HEIGHT: 67 IN

## 2025-08-06 DIAGNOSIS — Z98.890 POST-OPERATIVE STATE: Primary | ICD-10-CM

## 2025-08-06 DIAGNOSIS — D06.9 ADENOCARCINOMA IN SITU (AIS) OF UTERINE CERVIX: ICD-10-CM

## 2025-08-06 LAB
ABO + RH BLD: NORMAL
BLD GP AB SCN SERPL QL: NEGATIVE
HCG UR QL: NEGATIVE
SPECIMEN EXP DATE BLD: NORMAL

## 2025-08-06 PROCEDURE — 250N000011 HC RX IP 250 OP 636: Performed by: OBSTETRICS & GYNECOLOGY

## 2025-08-06 PROCEDURE — 710N000012 HC RECOVERY PHASE 2, PER MINUTE: Performed by: OBSTETRICS & GYNECOLOGY

## 2025-08-06 PROCEDURE — 360N000077 HC SURGERY LEVEL 4, PER MIN: Performed by: OBSTETRICS & GYNECOLOGY

## 2025-08-06 PROCEDURE — 250N000011 HC RX IP 250 OP 636: Performed by: NURSE ANESTHETIST, CERTIFIED REGISTERED

## 2025-08-06 PROCEDURE — 710N000010 HC RECOVERY PHASE 1, LEVEL 2, PER MIN: Performed by: OBSTETRICS & GYNECOLOGY

## 2025-08-06 PROCEDURE — 88309 TISSUE EXAM BY PATHOLOGIST: CPT | Mod: 26 | Performed by: PATHOLOGY

## 2025-08-06 PROCEDURE — 272N000001 HC OR GENERAL SUPPLY STERILE: Performed by: OBSTETRICS & GYNECOLOGY

## 2025-08-06 PROCEDURE — 81025 URINE PREGNANCY TEST: CPT | Performed by: OBSTETRICS & GYNECOLOGY

## 2025-08-06 PROCEDURE — 88309 TISSUE EXAM BY PATHOLOGIST: CPT | Mod: TC | Performed by: OBSTETRICS & GYNECOLOGY

## 2025-08-06 PROCEDURE — 86901 BLOOD TYPING SEROLOGIC RH(D): CPT | Performed by: OBSTETRICS & GYNECOLOGY

## 2025-08-06 PROCEDURE — 250N000009 HC RX 250: Performed by: NURSE ANESTHETIST, CERTIFIED REGISTERED

## 2025-08-06 PROCEDURE — 36415 COLL VENOUS BLD VENIPUNCTURE: CPT | Performed by: OBSTETRICS & GYNECOLOGY

## 2025-08-06 PROCEDURE — 370N000017 HC ANESTHESIA TECHNICAL FEE, PER MIN: Performed by: OBSTETRICS & GYNECOLOGY

## 2025-08-06 PROCEDURE — 250N000013 HC RX MED GY IP 250 OP 250 PS 637: Performed by: STUDENT IN AN ORGANIZED HEALTH CARE EDUCATION/TRAINING PROGRAM

## 2025-08-06 PROCEDURE — 258N000003 HC RX IP 258 OP 636: Performed by: NURSE ANESTHETIST, CERTIFIED REGISTERED

## 2025-08-06 PROCEDURE — 250N000011 HC RX IP 250 OP 636: Performed by: STUDENT IN AN ORGANIZED HEALTH CARE EDUCATION/TRAINING PROGRAM

## 2025-08-06 PROCEDURE — 250N000013 HC RX MED GY IP 250 OP 250 PS 637: Performed by: OBSTETRICS & GYNECOLOGY

## 2025-08-06 PROCEDURE — 250N000013 HC RX MED GY IP 250 OP 250 PS 637

## 2025-08-06 PROCEDURE — 999N000141 HC STATISTIC PRE-PROCEDURE NURSING ASSESSMENT: Performed by: OBSTETRICS & GYNECOLOGY

## 2025-08-06 RX ORDER — DEXAMETHASONE SODIUM PHOSPHATE 4 MG/ML
4 INJECTION, SOLUTION INTRA-ARTICULAR; INTRALESIONAL; INTRAMUSCULAR; INTRAVENOUS; SOFT TISSUE
Status: DISCONTINUED | OUTPATIENT
Start: 2025-08-06 | End: 2025-08-06 | Stop reason: HOSPADM

## 2025-08-06 RX ORDER — ONDANSETRON 2 MG/ML
4 INJECTION INTRAMUSCULAR; INTRAVENOUS EVERY 30 MIN PRN
Status: DISCONTINUED | OUTPATIENT
Start: 2025-08-06 | End: 2025-08-06 | Stop reason: HOSPADM

## 2025-08-06 RX ORDER — ONDANSETRON 2 MG/ML
INJECTION INTRAMUSCULAR; INTRAVENOUS PRN
Status: DISCONTINUED | OUTPATIENT
Start: 2025-08-06 | End: 2025-08-06

## 2025-08-06 RX ORDER — KETOROLAC TROMETHAMINE 30 MG/ML
30 INJECTION, SOLUTION INTRAMUSCULAR; INTRAVENOUS ONCE
Status: COMPLETED | OUTPATIENT
Start: 2025-08-06 | End: 2025-08-06

## 2025-08-06 RX ORDER — ACETAMINOPHEN 325 MG/1
650 TABLET ORAL EVERY 6 HOURS PRN
Qty: 24 TABLET | Refills: 0 | Status: SHIPPED | OUTPATIENT
Start: 2025-08-06

## 2025-08-06 RX ORDER — ONDANSETRON 4 MG/1
4 TABLET, ORALLY DISINTEGRATING ORAL EVERY 30 MIN PRN
Status: DISCONTINUED | OUTPATIENT
Start: 2025-08-06 | End: 2025-08-06 | Stop reason: HOSPADM

## 2025-08-06 RX ORDER — METRONIDAZOLE 500 MG/100ML
500 INJECTION, SOLUTION INTRAVENOUS
Status: COMPLETED | OUTPATIENT
Start: 2025-08-06 | End: 2025-08-06

## 2025-08-06 RX ORDER — NALOXONE HYDROCHLORIDE 0.4 MG/ML
0.1 INJECTION, SOLUTION INTRAMUSCULAR; INTRAVENOUS; SUBCUTANEOUS
Status: DISCONTINUED | OUTPATIENT
Start: 2025-08-06 | End: 2025-08-06 | Stop reason: HOSPADM

## 2025-08-06 RX ORDER — FENTANYL CITRATE 50 UG/ML
INJECTION, SOLUTION INTRAMUSCULAR; INTRAVENOUS PRN
Status: DISCONTINUED | OUTPATIENT
Start: 2025-08-06 | End: 2025-08-06

## 2025-08-06 RX ORDER — SODIUM CHLORIDE, SODIUM LACTATE, POTASSIUM CHLORIDE, CALCIUM CHLORIDE 600; 310; 30; 20 MG/100ML; MG/100ML; MG/100ML; MG/100ML
INJECTION, SOLUTION INTRAVENOUS CONTINUOUS PRN
Status: DISCONTINUED | OUTPATIENT
Start: 2025-08-06 | End: 2025-08-06

## 2025-08-06 RX ORDER — LIDOCAINE 40 MG/G
CREAM TOPICAL
Status: DISCONTINUED | OUTPATIENT
Start: 2025-08-06 | End: 2025-08-06 | Stop reason: HOSPADM

## 2025-08-06 RX ORDER — SODIUM CHLORIDE, SODIUM LACTATE, POTASSIUM CHLORIDE, CALCIUM CHLORIDE 600; 310; 30; 20 MG/100ML; MG/100ML; MG/100ML; MG/100ML
INJECTION, SOLUTION INTRAVENOUS CONTINUOUS
Status: DISCONTINUED | OUTPATIENT
Start: 2025-08-06 | End: 2025-08-06 | Stop reason: HOSPADM

## 2025-08-06 RX ORDER — PROPOFOL 10 MG/ML
INJECTION, EMULSION INTRAVENOUS CONTINUOUS PRN
Status: DISCONTINUED | OUTPATIENT
Start: 2025-08-06 | End: 2025-08-06

## 2025-08-06 RX ORDER — PROPOFOL 10 MG/ML
INJECTION, EMULSION INTRAVENOUS PRN
Status: DISCONTINUED | OUTPATIENT
Start: 2025-08-06 | End: 2025-08-06

## 2025-08-06 RX ORDER — OXYCODONE HYDROCHLORIDE 5 MG/1
5 TABLET ORAL EVERY 6 HOURS PRN
Qty: 6 TABLET | Refills: 0 | Status: SHIPPED | OUTPATIENT
Start: 2025-08-06

## 2025-08-06 RX ORDER — FENTANYL CITRATE 50 UG/ML
25 INJECTION, SOLUTION INTRAMUSCULAR; INTRAVENOUS EVERY 5 MIN PRN
Status: DISCONTINUED | OUTPATIENT
Start: 2025-08-06 | End: 2025-08-06 | Stop reason: HOSPADM

## 2025-08-06 RX ORDER — OXYCODONE HYDROCHLORIDE 5 MG/1
5 TABLET ORAL
Status: COMPLETED | OUTPATIENT
Start: 2025-08-06 | End: 2025-08-06

## 2025-08-06 RX ORDER — FENTANYL CITRATE 50 UG/ML
50 INJECTION, SOLUTION INTRAMUSCULAR; INTRAVENOUS EVERY 5 MIN PRN
Status: DISCONTINUED | OUTPATIENT
Start: 2025-08-06 | End: 2025-08-06 | Stop reason: HOSPADM

## 2025-08-06 RX ORDER — CEFAZOLIN SODIUM/WATER 2 G/20 ML
2 SYRINGE (ML) INTRAVENOUS SEE ADMIN INSTRUCTIONS
Status: DISCONTINUED | OUTPATIENT
Start: 2025-08-06 | End: 2025-08-06 | Stop reason: HOSPADM

## 2025-08-06 RX ORDER — ACETAMINOPHEN 325 MG/1
975 TABLET ORAL ONCE
Status: COMPLETED | OUTPATIENT
Start: 2025-08-06 | End: 2025-08-06

## 2025-08-06 RX ORDER — IBUPROFEN 200 MG
800 TABLET ORAL ONCE
Status: DISCONTINUED | OUTPATIENT
Start: 2025-08-06 | End: 2025-08-06

## 2025-08-06 RX ORDER — HYDROMORPHONE HCL IN WATER/PF 6 MG/30 ML
0.2 PATIENT CONTROLLED ANALGESIA SYRINGE INTRAVENOUS EVERY 5 MIN PRN
Status: DISCONTINUED | OUTPATIENT
Start: 2025-08-06 | End: 2025-08-06 | Stop reason: HOSPADM

## 2025-08-06 RX ORDER — CEFAZOLIN SODIUM/WATER 2 G/20 ML
2 SYRINGE (ML) INTRAVENOUS
Status: COMPLETED | OUTPATIENT
Start: 2025-08-06 | End: 2025-08-06

## 2025-08-06 RX ORDER — OXYCODONE HYDROCHLORIDE 5 MG/1
5 TABLET ORAL
Status: DISCONTINUED | OUTPATIENT
Start: 2025-08-06 | End: 2025-08-06 | Stop reason: HOSPADM

## 2025-08-06 RX ORDER — LIDOCAINE HYDROCHLORIDE 20 MG/ML
INJECTION, SOLUTION INFILTRATION; PERINEURAL PRN
Status: DISCONTINUED | OUTPATIENT
Start: 2025-08-06 | End: 2025-08-06

## 2025-08-06 RX ORDER — DEXAMETHASONE SODIUM PHOSPHATE 4 MG/ML
INJECTION, SOLUTION INTRA-ARTICULAR; INTRALESIONAL; INTRAMUSCULAR; INTRAVENOUS; SOFT TISSUE PRN
Status: DISCONTINUED | OUTPATIENT
Start: 2025-08-06 | End: 2025-08-06

## 2025-08-06 RX ORDER — AMOXICILLIN 250 MG
1-2 CAPSULE ORAL 2 TIMES DAILY
Qty: 30 TABLET | Refills: 0 | Status: SHIPPED | OUTPATIENT
Start: 2025-08-06

## 2025-08-06 RX ORDER — HYDROMORPHONE HCL IN WATER/PF 6 MG/30 ML
0.4 PATIENT CONTROLLED ANALGESIA SYRINGE INTRAVENOUS EVERY 5 MIN PRN
Status: DISCONTINUED | OUTPATIENT
Start: 2025-08-06 | End: 2025-08-06 | Stop reason: HOSPADM

## 2025-08-06 RX ORDER — PHENAZOPYRIDINE HYDROCHLORIDE 200 MG/1
200 TABLET, FILM COATED ORAL ONCE
Status: COMPLETED | OUTPATIENT
Start: 2025-08-06 | End: 2025-08-06

## 2025-08-06 RX ORDER — IBUPROFEN 200 MG
800 TABLET ORAL ONCE
Status: DISCONTINUED | OUTPATIENT
Start: 2025-08-06 | End: 2025-08-06 | Stop reason: HOSPADM

## 2025-08-06 RX ORDER — IBUPROFEN 600 MG/1
600 TABLET, FILM COATED ORAL EVERY 6 HOURS PRN
Qty: 12 TABLET | Refills: 0 | Status: SHIPPED | OUTPATIENT
Start: 2025-08-06

## 2025-08-06 RX ORDER — BUPIVACAINE HYDROCHLORIDE 2.5 MG/ML
INJECTION, SOLUTION EPIDURAL; INFILTRATION; INTRACAUDAL; PERINEURAL PRN
Status: DISCONTINUED | OUTPATIENT
Start: 2025-08-06 | End: 2025-08-06 | Stop reason: HOSPADM

## 2025-08-06 RX ORDER — OXYCODONE HYDROCHLORIDE 10 MG/1
10 TABLET ORAL
Status: DISCONTINUED | OUTPATIENT
Start: 2025-08-06 | End: 2025-08-06 | Stop reason: HOSPADM

## 2025-08-06 RX ORDER — HEPARIN SODIUM 5000 [USP'U]/.5ML
5000 INJECTION, SOLUTION INTRAVENOUS; SUBCUTANEOUS
Status: COMPLETED | OUTPATIENT
Start: 2025-08-06 | End: 2025-08-06

## 2025-08-06 RX ADMIN — OXYCODONE HYDROCHLORIDE 5 MG: 5 TABLET ORAL at 10:29

## 2025-08-06 RX ADMIN — Medication 2 G: at 07:30

## 2025-08-06 RX ADMIN — ACETAMINOPHEN 975 MG: 325 TABLET ORAL at 12:26

## 2025-08-06 RX ADMIN — PROPOFOL 150 MCG/KG/MIN: 10 INJECTION, EMULSION INTRAVENOUS at 07:40

## 2025-08-06 RX ADMIN — HEPARIN SODIUM 5000 UNITS: 5000 INJECTION, SOLUTION INTRAVENOUS; SUBCUTANEOUS at 06:44

## 2025-08-06 RX ADMIN — HYDROMORPHONE HYDROCHLORIDE 0.5 MG: 1 INJECTION, SOLUTION INTRAMUSCULAR; INTRAVENOUS; SUBCUTANEOUS at 09:07

## 2025-08-06 RX ADMIN — DEXAMETHASONE SODIUM PHOSPHATE 4 MG: 4 INJECTION, SOLUTION INTRAMUSCULAR; INTRAVENOUS at 09:24

## 2025-08-06 RX ADMIN — LIDOCAINE HYDROCHLORIDE 80 MG: 20 INJECTION, SOLUTION INFILTRATION; PERINEURAL at 07:41

## 2025-08-06 RX ADMIN — FENTANYL CITRATE 50 MCG: 50 INJECTION INTRAMUSCULAR; INTRAVENOUS at 08:06

## 2025-08-06 RX ADMIN — Medication 100 MG: at 09:16

## 2025-08-06 RX ADMIN — Medication 10 MG: at 08:41

## 2025-08-06 RX ADMIN — Medication 50 MG: at 07:41

## 2025-08-06 RX ADMIN — MIDAZOLAM 2 MG: 1 INJECTION INTRAMUSCULAR; INTRAVENOUS at 07:40

## 2025-08-06 RX ADMIN — PROPOFOL 160 MG: 10 INJECTION, EMULSION INTRAVENOUS at 07:41

## 2025-08-06 RX ADMIN — SODIUM CHLORIDE, SODIUM LACTATE, POTASSIUM CHLORIDE, AND CALCIUM CHLORIDE: .6; .31; .03; .02 INJECTION, SOLUTION INTRAVENOUS at 07:40

## 2025-08-06 RX ADMIN — METRONIDAZOLE 500 MG: 500 INJECTION, SOLUTION INTRAVENOUS at 06:38

## 2025-08-06 RX ADMIN — ONDANSETRON 4 MG: 2 INJECTION INTRAMUSCULAR; INTRAVENOUS at 09:09

## 2025-08-06 RX ADMIN — FENTANYL CITRATE 50 MCG: 50 INJECTION INTRAMUSCULAR; INTRAVENOUS at 07:39

## 2025-08-06 RX ADMIN — KETOROLAC TROMETHAMINE 30 MG: 30 INJECTION, SOLUTION INTRAMUSCULAR at 10:47

## 2025-08-06 RX ADMIN — PHENAZOPYRIDINE 200 MG: 200 TABLET ORAL at 06:17

## 2025-08-06 RX ADMIN — ACETAMINOPHEN 975 MG: 325 TABLET ORAL at 06:17

## 2025-08-06 RX ADMIN — Medication 20 MG: at 08:20

## 2025-08-06 ASSESSMENT — ACTIVITIES OF DAILY LIVING (ADL)
ADLS_ACUITY_SCORE: 33
ADLS_ACUITY_SCORE: 32

## 2025-08-07 ENCOUNTER — PATIENT OUTREACH (OUTPATIENT)
Dept: ONCOLOGY | Facility: CLINIC | Age: 45
End: 2025-08-07
Payer: COMMERCIAL

## 2025-08-16 ENCOUNTER — TELEPHONE (OUTPATIENT)
Dept: ONCOLOGY | Facility: CLINIC | Age: 45
End: 2025-08-16
Payer: COMMERCIAL

## 2025-08-28 ENCOUNTER — ONCOLOGY VISIT (OUTPATIENT)
Dept: ONCOLOGY | Facility: CLINIC | Age: 45
End: 2025-08-28
Attending: OBSTETRICS & GYNECOLOGY
Payer: COMMERCIAL

## 2025-08-28 VITALS
DIASTOLIC BLOOD PRESSURE: 74 MMHG | HEIGHT: 67 IN | SYSTOLIC BLOOD PRESSURE: 107 MMHG | BODY MASS INDEX: 24.61 KG/M2 | WEIGHT: 156.8 LBS | OXYGEN SATURATION: 98 % | HEART RATE: 70 BPM

## 2025-08-28 ASSESSMENT — PAIN SCALES - GENERAL: PAINLEVEL_OUTOF10: NO PAIN (0)

## (undated) DEVICE — ANTIFOG SOLUTION W/FOAM PAD 31142527

## (undated) DEVICE — SU MONOCRYL 4-0 PS-2 27" UND Y426H

## (undated) DEVICE — ESU LIGASURE LAPAROSCOPIC BLUNT TIP SEALER 5MMX37CM LF1837

## (undated) DEVICE — LINEN TOWEL PACK X30 5481

## (undated) DEVICE — SU ENDO STITCH POLYSORB 2-0 ES-9 48"  170053

## (undated) DEVICE — DRAPE SHEET MED 44X70" 9355

## (undated) DEVICE — NDL INSUFFLATION 13GA 120MM C2201

## (undated) DEVICE — ENDO TROCAR FIRST ENTRY KII FIOS Z-THRD 12X100MM CTF73

## (undated) DEVICE — PREP CHLORAPREP 26ML TINTED HI-LITE ORANGE 930815

## (undated) DEVICE — DEVICE SUTURE PASSER 14GA WECK EFX EFXSP2

## (undated) DEVICE — DRSG TEGADERM 2 3/8X2 3/4" 1624W

## (undated) DEVICE — SUCTION IRRIGATION STRYKFLOW II W/TIP DISP 250-070-520

## (undated) DEVICE — TUBING SMOKE EVAC PNEUMOCLEAR HIGH FLOW 0620050250

## (undated) DEVICE — SU VICRYL 2-0 SH 27" UND J417H

## (undated) DEVICE — SPECIMEN TRAP MUCOUS 40ML LUKI C30200A

## (undated) DEVICE — JELLY LUBRICATING SURGILUBE 2OZ TUBE

## (undated) DEVICE — LINEN TOWEL PACK X6 WHITE 5487

## (undated) DEVICE — SOL NACL 0.9% INJ 1000ML BAG 2B1324X

## (undated) DEVICE — SYR 50ML LL W/O NDL 309653

## (undated) DEVICE — SUCTION MANIFOLD NEPTUNE 2 SYS 4 PORT 0702-020-000

## (undated) DEVICE — KIT POSITIONER TRENDELENBURG NUBLUE TP3000-NB

## (undated) DEVICE — SU VICRYL 2-0 CT-2 27" J333H

## (undated) DEVICE — ADH LIQUID MASTISOL TOPICAL VIAL 2-3ML 0523-48

## (undated) DEVICE — GLOVE GAMMEX NEOPRENE ULTRA SZ 7.5 LF 8515

## (undated) DEVICE — TUBING IRRIG CYSTO/BLADDER SET 81" LF 2C4040

## (undated) DEVICE — SOL WATER IRRIG 1000ML BOTTLE 2F7114

## (undated) DEVICE — DRSG GAUZE 2X2" 8042

## (undated) DEVICE — KOH COLPOTOMIZER OCCLUDER  CPO-6

## (undated) DEVICE — SOL NACL 0.9% IRRIG 1000ML BOTTLE 2F7124

## (undated) DEVICE — CATH TRAY FOLEY 16FR W/URINE METER STATLOCK 303316A

## (undated) DEVICE — UTERINE MANIPULATOR RUMI 6.7MMX8CM UMB678

## (undated) DEVICE — Device

## (undated) DEVICE — ENDO TROCAR FIRST ENTRY KII FIOS Z-THRD 05X100MM CTF03

## (undated) DEVICE — ENDO TROCAR SLEEVE KII Z-THREADED 05X100MM CTS02

## (undated) DEVICE — ESU GROUND PAD ADULT W/CORD E7507

## (undated) DEVICE — SUCTION IRR STRYKERFLOW II W/TIP 250-070-520

## (undated) DEVICE — ESU ENDO SCISSORS 5MM CVD 5DCS

## (undated) RX ORDER — CEFAZOLIN SODIUM/WATER 2 G/20 ML
SYRINGE (ML) INTRAVENOUS
Status: DISPENSED
Start: 2025-08-06

## (undated) RX ORDER — OXYCODONE HYDROCHLORIDE 5 MG/1
TABLET ORAL
Status: DISPENSED
Start: 2025-08-06

## (undated) RX ORDER — HEPARIN SODIUM 5000 [USP'U]/.5ML
INJECTION, SOLUTION INTRAVENOUS; SUBCUTANEOUS
Status: DISPENSED
Start: 2025-08-06

## (undated) RX ORDER — HYDROMORPHONE HYDROCHLORIDE 1 MG/ML
INJECTION, SOLUTION INTRAMUSCULAR; INTRAVENOUS; SUBCUTANEOUS
Status: DISPENSED
Start: 2025-08-06

## (undated) RX ORDER — PROPOFOL 10 MG/ML
INJECTION, EMULSION INTRAVENOUS
Status: DISPENSED
Start: 2025-08-06

## (undated) RX ORDER — KETOROLAC TROMETHAMINE 30 MG/ML
INJECTION, SOLUTION INTRAMUSCULAR; INTRAVENOUS
Status: DISPENSED
Start: 2025-08-06

## (undated) RX ORDER — ACETAMINOPHEN 325 MG/1
TABLET ORAL
Status: DISPENSED
Start: 2025-08-06

## (undated) RX ORDER — METRONIDAZOLE 500 MG/100ML
INJECTION, SOLUTION INTRAVENOUS
Status: DISPENSED
Start: 2025-08-06

## (undated) RX ORDER — PHENAZOPYRIDINE HYDROCHLORIDE 200 MG/1
TABLET, FILM COATED ORAL
Status: DISPENSED
Start: 2025-08-06

## (undated) RX ORDER — BUPIVACAINE HYDROCHLORIDE 2.5 MG/ML
INJECTION, SOLUTION EPIDURAL; INFILTRATION; INTRACAUDAL; PERINEURAL
Status: DISPENSED
Start: 2025-08-06

## (undated) RX ORDER — ONDANSETRON 2 MG/ML
INJECTION INTRAMUSCULAR; INTRAVENOUS
Status: DISPENSED
Start: 2025-08-06

## (undated) RX ORDER — FENTANYL CITRATE 50 UG/ML
INJECTION, SOLUTION INTRAMUSCULAR; INTRAVENOUS
Status: DISPENSED
Start: 2025-08-06